# Patient Record
Sex: FEMALE | Race: WHITE | NOT HISPANIC OR LATINO | Employment: FULL TIME | URBAN - METROPOLITAN AREA
[De-identification: names, ages, dates, MRNs, and addresses within clinical notes are randomized per-mention and may not be internally consistent; named-entity substitution may affect disease eponyms.]

---

## 2018-07-30 LAB — POC BETA-HCG (QUAL): NEGATIVE

## 2018-08-17 ENCOUNTER — HISTORICAL (OUTPATIENT)
Dept: SURGERY | Facility: HOSPITAL | Age: 48
End: 2018-08-17

## 2018-08-17 LAB
ABS NEUT (OLG): 3.1 X10(3)/MCL (ref 1.5–6.9)
ALBUMIN SERPL-MCNC: 3.9 GM/DL (ref 3.4–5)
ALBUMIN/GLOB SERPL: 1 RATIO
ALP SERPL-CCNC: 62 UNIT/L (ref 30–113)
ALT SERPL-CCNC: 27 UNIT/L (ref 10–45)
APTT PPP: 24.6 SECOND(S) (ref 25–35)
AST SERPL-CCNC: 14 UNIT/L (ref 15–37)
B-HCG SERPL QL: NEGATIVE
BASOPHILS # BLD AUTO: 0 X10(3)/MCL (ref 0–0.1)
BASOPHILS NFR BLD AUTO: 0 % (ref 0–1)
BILIRUB SERPL-MCNC: 0.2 MG/DL (ref 0.1–0.9)
BILIRUBIN DIRECT+TOT PNL SERPL-MCNC: 0.1 MG/DL
BILIRUBIN DIRECT+TOT PNL SERPL-MCNC: 0.1 MG/DL (ref 0–0.3)
BUN SERPL-MCNC: 10 MG/DL (ref 10–20)
CALCIUM SERPL-MCNC: 9.6 MG/DL (ref 8–10.5)
CHLORIDE SERPL-SCNC: 108 MMOL/L (ref 100–108)
CO2 SERPL-SCNC: 29 MMOL/L (ref 21–35)
CREAT SERPL-MCNC: 1.16 MG/DL (ref 0.7–1.3)
EOSINOPHIL # BLD AUTO: 0.1 X10(3)/MCL (ref 0–0.6)
EOSINOPHIL NFR BLD AUTO: 1 % (ref 0–5)
ERYTHROCYTE [DISTWIDTH] IN BLOOD BY AUTOMATED COUNT: 16.2 % (ref 11.5–17)
GLOBULIN SER-MCNC: 3.8 GM/DL
GLUCOSE SERPL-MCNC: 124 MG/DL (ref 75–116)
HCT VFR BLD AUTO: 34.3 % (ref 36–48)
HGB BLD-MCNC: 10.6 GM/DL (ref 12–16)
INR PPP: 0.9 (ref 0–1.2)
LYMPHOCYTES # BLD AUTO: 3.9 X10(3)/MCL (ref 0.5–4.1)
LYMPHOCYTES NFR BLD AUTO: 48.3 % (ref 15–40)
MCH RBC QN AUTO: 28 PG (ref 27–34)
MCHC RBC AUTO-ENTMCNC: 31 GM/DL (ref 31–36)
MCV RBC AUTO: 91 FL (ref 80–99)
MONOCYTES # BLD AUTO: 1 X10(3)/MCL (ref 0–1.1)
MONOCYTES NFR BLD AUTO: 12 % (ref 4–12)
NEUTROPHILS # BLD AUTO: 3.1 X10(3)/MCL (ref 1.5–6.9)
NEUTROPHILS NFR BLD AUTO: 38 % (ref 43–75)
PLATELET # BLD AUTO: 302 X10(3)/MCL (ref 140–400)
PMV BLD AUTO: 10.5 FL (ref 6.8–10)
POTASSIUM SERPL-SCNC: 4.9 MMOL/L (ref 3.6–5.2)
PROT SERPL-MCNC: 7.7 GM/DL (ref 6.4–8.2)
PROTHROMBIN TIME: 9.5 SECOND(S) (ref 9–12)
RBC # BLD AUTO: 3.78 X10(6)/MCL (ref 4.2–5.4)
SODIUM SERPL-SCNC: 145 MMOL/L (ref 135–145)
WBC # SPEC AUTO: 8.1 X10(3)/MCL (ref 4.5–11.5)

## 2018-08-31 ENCOUNTER — HISTORICAL (OUTPATIENT)
Dept: ANESTHESIOLOGY | Facility: HOSPITAL | Age: 48
End: 2018-08-31

## 2018-08-31 LAB
B-HCG SERPL QL: NEGATIVE
CRC RECOMMENDATION EXT: NORMAL

## 2018-09-27 ENCOUNTER — HISTORICAL (OUTPATIENT)
Dept: MEDSURG UNIT | Facility: HOSPITAL | Age: 48
End: 2018-09-27

## 2018-09-27 LAB — B-HCG SERPL QL: NEGATIVE

## 2018-12-04 LAB — POC BETA-HCG (QUAL): NEGATIVE

## 2019-09-18 LAB — POC BETA-HCG (QUAL): NEGATIVE

## 2019-09-25 ENCOUNTER — HISTORICAL (OUTPATIENT)
Dept: RADIOLOGY | Facility: HOSPITAL | Age: 49
End: 2019-09-25

## 2019-10-29 ENCOUNTER — HISTORICAL (OUTPATIENT)
Dept: ADMINISTRATIVE | Facility: HOSPITAL | Age: 49
End: 2019-10-29

## 2020-01-28 ENCOUNTER — HISTORICAL (OUTPATIENT)
Dept: SURGERY | Facility: HOSPITAL | Age: 50
End: 2020-01-28

## 2020-01-28 LAB — POC BETA-HCG (QUAL): NEGATIVE

## 2022-01-21 LAB
HUMAN PAPILLOMAVIRUS (HPV): NORMAL
PAP RECOMMENDATION EXT: NORMAL
PAP SMEAR: NORMAL

## 2022-04-10 ENCOUNTER — HISTORICAL (OUTPATIENT)
Dept: ADMINISTRATIVE | Facility: HOSPITAL | Age: 52
End: 2022-04-10
Payer: MEDICARE

## 2022-04-27 VITALS
WEIGHT: 145.5 LBS | BODY MASS INDEX: 29.33 KG/M2 | DIASTOLIC BLOOD PRESSURE: 85 MMHG | SYSTOLIC BLOOD PRESSURE: 126 MMHG | HEIGHT: 59 IN

## 2022-04-30 NOTE — OP NOTE
ADMITTING DIAGNOSIS:  Symptomatic hemorrhoids, grade 3 type.    PROCEDURE:    1. PPH stapling of symptomatic hemorrhoids, grade 3 type.  2. Grade 3 internal hemorrhoids.  3. Incision and drainage of external thrombosed hemorrhoids at the 3, 6, 9 and 12 o'clock positions.    INDICATIONS:  The patient is a 47-year-old  female with a bipolar disorder, anemia, hypothyroidism, hypercholesterolemia, gastroesophageal reflux disease, and smokes about a half pack a day for 30 years.  The patient had symptomatic hemorrhoid disease with rectal pain.  No blood with wiping.  No family history of colorectal carcinoma.  She had a colonoscopy that showed grade 3 hemorrhoids that had been present for several weeks.  She desired hemorrhoidectomy because of her symptomatic issues.  Patient stated later on that she also had some blood per rectum.    DESCRIPTION OF PROCEDURE:  The patient was brought to the operating room.  Prone isi-knife position.  Spinal anesthetic.  Prepped and draped in a sterile fashion.  Had an anal dilator placed, sutured with 0 silk at the 3, 6, 9, and 12 o'clock positions.  Patient then had an anal probe inserted and pursestring suturing of the anorectal mucosa with a 2-0 Prolene.  Resection of the hemorrhoids was done with a 33 mm PPH stapler, Ethicon type.  Good circumferential mucosal proctectomy was done with a ring of rectal mucosal tissue excised.  The patient also had incision and drainage of thrombosed hemorrhoids at the 3, 6, 9 and 12 o'clock positions.  A skin tag at the 6 o'clock position was removed as well with Bovie cautery.  The wound was left open for drainage.  The patient had hemostasis with Bovie cautery.  Sterile dressings were applied.  No problems were encountered.  Patient tolerated procedure well.     I appreciate the consultation referral from Dr. Gerry Cline and will notify him of my findings as well as Dr. Moncada and Dr. Hirsch.       It should be noted that  the patient will stay overnight for monitoring because of her spinal anesthetic.        BBS/MODL   DD: 09/27/2018 1709   DT: 09/27/2018 1753  Job # [id number]    cc: Dr. Coral Cline Jr, M.D.

## 2022-04-30 NOTE — OP NOTE
DATE OF SURGERY:    01/28/2020    SURGEON:  Lazaro Crawford MD    PROCEDURE:  Bilateral reduction mammoplasty.    ANESTHESIA:  General.    HISTORY:  This is a 49-year-old female who presented with symptomatic macromastia.  She desired a breast reduction surgery.  Options were discussed, and she elected to proceed with a Wise pattern inferior pedicle type reduction.  She understood that there was a risk of potential need for free nipple graft if necessary.  She also understood that she was at increased risk of wound healing complications and necrosis of tissues secondary to her long history of smoking.  She did, however, quit smoking for approximately 4 weeks prior to surgery and did test negative for urine cotinine at her preoperative visit.  She signed the informed consent.    DESCRIPTION OF PROCEDURE:  The patient was identified in the preoperative holding area.  She was marked for a Bray pattern, inferior pedicle reduction mammoplasty, 42 mm nipple areolar complex.  She was then taken to the operating room, placed in supine position.  General endotracheal anesthesia was provided.  She was prepped and draped in sterile surgical fashion.  Time-out was taken, everyone was in agreement.       We initially started the procedure by de-epithelializing the pedicle around the nipple-areolar complex on the patient's right side.  I then incised the markings and elevated superior skin flaps approximately 2 cm thick down to the chest wall.  I then removed tissue from the medial, lateral, and posterior aspects surrounding the pedicle with electrocautery.  I copiously irrigated out the wound, ensured hemostasis with electrocautery.  I then subsequently temporarily tacked the incisions closed with staples and then subsequently moved to the patient's left side, similarly de-epithelialized the pedicle, subsequently removed tissue with electrocautery from the medial, lateral, and posterior aspects surrounding the pedicle.  I  copiously irrigated out the wounds, ensured hemostasis, and temporarily tacked the wound to evaluate for symmetry in both the upright and supine positions, which was noted to be very good.       I then used a 42 mm template to alex the area of nipple-areolar complex translocation.  This done right and left sides.  I then incised the skin with a 15 blade scalpel and removed this extra tissue with electrocautery.  Please note, right and left breast specimens were then passed off the field.  They were weighed.  I then subsequently translocated each nipple-areolar complex.  Dermal structures of the inframammary fold and vertical incision were closed with 0 Vicryl sutures in interrupted fashion.  Dermal structures of the nipple-areolar complex were approximated with 3-0 Vicryl in interrupted fashion.  The inframammary fold incision was closed with 2-0 subcuticular Stratafix.  The vertical incisions and nipple-areolar complex were     then further closed with 3-0 Monocryl in a subcuticular fashion on the right and left sides.  __________ tape was then placed as dressing.  There were no complications.  All counts were correct.        ______________________________  MD DUKE Arzate/  DD:  01/28/2020  Time:  11:34AM  DT:  01/28/2020  Time:  11:52AM  Job #:  [id number]

## 2022-05-03 NOTE — HISTORICAL [facility] CERNER
This is a historical note converted from [facility]. Formatting and pictures may have been removed.  Please reference Aidan for original formatting and attached multimedia. Chief Complaint  1 month f/u left ankle fx, xrays today....sm  History of Present Illness  having much less pain in the ankle  Patient here today for follow-up from left ankle fracture  Having less pain  Still with some swelling and stiffness  No numbness or tingling  Review of Systems  Constitutional: No fever, No chills.  Respiratory: No shortness of breath, No cough.  Cardiovascular: No chest pain.  Gastrointestinal: No nausea, No vomiting, No diarrhea, No constipation, No heartburn.  Genitourinary: No dysuria, No hematuria.  Hematology/Lymphatics: No bleeding tendency.  Endocrine: No polyuria.  Neurologic: Alert and oriented X4, No numbness, No tingling.  Psychiatric: No anxiety, No depression.  Integumentary: ?negative except as documented in history of present illness  Physical Exam  Vitals & Measurements  T:?98.1? ?F (Oral)? HR:?84(Peripheral)? BP:?126/82?  HT:?150?cm? WT:?83.91?kg? BMI:?37.29? LMP:?10/22/2019 00:00 CDT?  left ankle?exam  ROM?near normal  Mild swelling and decreased ROM  CMS intact to the?LLE  mild TTP over fracture site  ???  X-rays show? healing fracture with callus formation  ?   Plan:  Continue with home exercises  Follow-up in prn  ?   may begin to wean out of the boot  ?   Patient seen and examined today by Dr. Hatfield  Assessment/Plan  1.?Avulsion fracture of left ankle?S82.892A  Ordered:  Clinic Follow up, 10/29/19 10:04:00 CDT, prn, Avulsion fracture of left ankle, [facility]  Office/Outpatient Visit Level 3 Established [id number] PC, Avulsion fracture of left ankle, [facility], 10/29/19 10:04:00 CDT  ?  Referrals  Clinic Follow up, 10/29/19 10:04:00 CDT, prn, Avulsion fracture of left ankle, [facility]   Problem List/Past Medical History  Ongoing  Avulsion fracture of left  ankle  Bipolar  Bipolar  Carpal tunnel  Hemorrhoid  Hypothyroid  Hypothyroidism  Tobacco user  Historical  No qualifying data  Procedure/Surgical History  Excision of Anus, External Approach (09/27/2018)  Excision of Hemorrhoidal Plexus, Open Approach (09/27/2018)  Excision of multiple external papillae or tags, anus (09/27/2018)  Hemorrhoidectomy W/Stapling (09/27/2018)  Hemorrhoidopexy (eg, for prolapsing internal hemorrhoids) by stapling (09/27/2018)  Incision of thrombosed hemorrhoid, external (09/27/2018)  Incision of thrombosed hemorrhoid, external (09/27/2018)  Incision of thrombosed hemorrhoid, external (09/27/2018)  Incision of thrombosed hemorrhoid, external (09/27/2018)  Inspection of Gastrointestinal Tract, Open Approach (09/27/2018)  Biopsy Gastrointestinal (08/31/2018)  Colonoscopy (08/31/2018)  Colonoscopy, flexible; diagnostic, including collection of specimen(s) by brushing or washing, when performed (separate procedure) (08/31/2018)  Esophagogastroduodenoscopy (08/31/2018)  Esophagogastroduodenoscopy, flexible, transoral; with biopsy, single or multiple (08/31/2018)  Excision of Stomach, Via Natural or Artificial Opening Endoscopic, Diagnostic (08/31/2018)  Inspection of Lower Intestinal Tract, Via Natural or Artificial Opening Endoscopic (08/31/2018)  left carpal tunnel (08/23/2018)  Destruction of Esophagogastric Junction, Via Natural or Artificial Opening Endoscopic (07/15/2016)  EGD With Thrml Tx GERD (Upper, Upper, Upper) (07/15/2016)  Esophagogastroduodenoscopy, flexible, transoral; with delivery of thermal energy to the muscle of lower esophageal sphincter and/or gastric cardia, for treatment of gastroesophageal reflux disease (07/15/2016)  Bilateral tubal ligation  None  rt carpal tunnel  Tonsillectomy  tubes in ears   Medications  AZELASTINE SPR 0.1%, 1 spray, INH, BID  Chantix Starter Pack 0.5 mg-1 mg oral tablet  CLONAZEPAM TAB 1MG, 1 mg, Oral, Daily, PRN  Effexor  mg oral capsule,  extended release, 150 mg= 1 cap(s), Oral, qAM  Enbrace HR oral capsule, 1 cap(s), Oral, Daily, 12 refills  EpiPen 2-Juanito 0.3 mg injectable kit, See Instructions  ESTAZOLAM TAB 2MG, 2 mg= 1 tab(s), Oral, Once a day (at bedtime)  FLUTICASONE SPR 50MCG, 2 spray(s), Nasal, qAM  LaMICtal, 150 mg, Oral, qAM  levothyroxine 88 mcg (0.088 mg) oral tablet, 88 mcg= 1 tab(s), Oral, qAM  loratidine oral tablet, 10 mg, Oral, qAM  Protonix 40 mg ORAL enteric coated tablet, 40 mg= 1 tab(s), Oral, qAM  rizatriptan 5 mg oral tablet, 5 mg= 1 tab(s), Oral, Daily, PRN  ROSUVASTATIN TAB 10MG, 1 tab, Oral, qPM  traMADol 50 mg oral tablet, 50 mg= 1 tab(s), Oral, q6hr   oral tablet ([facility] Substitution), 1 tab(s), Oral, Daily, 11 refills  ziprasidone 80 mg oral capsule, 1 cap, Oral, qPM  Allergies  No Known Medication Allergies  Social History  Abuse/Neglect  No, 09/18/2019  Alcohol  Never, 04/14/2016  Employment/School  Work/School description: student - medical coding., 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  Hyperthyroidism.: Mother.  Health Maintenance  Health Maintenance  ???Pending?(in the next year)  ??? ??OverDue  ??? ? ? ?Diabetes Screening due??and every?  ??? ? ? ?Alcohol Misuse Screening due??01/01/19??and every 1??year(s)  ??? ? ? ?Smoking Cessation due??01/01/19??and every 1??year(s)  ??? ??Due?  ??? ? ? ?ADL Screening due??10/29/19??and every 1??year(s)  ??? ? ? ?Influenza Vaccine due??10/29/19??and every?  ??? ? ? ?Tetanus Vaccine due??10/29/19??and every 10??year(s)  ??? ??Due In Future?  ??? ? ? ?Obesity Screening not due until??01/01/20??and every 1??year(s)  ??? ? ? ?Blood Pressure Screening not due until??09/25/20??and every  1??year(s)  ??? ? ? ?Body Mass Index Check not due until??09/25/20??and every 1??year(s)  ???Satisfied?(in the past 1 year)  ??? ??Satisfied?  ??? ? ? ?Blood Pressure Screening on??10/29/19.??Satisfied by Renae Acosta LPN  ??? ? ? ?Body Mass Index Check on??10/29/19.??Satisfied by Renae Acosta LPN  ??? ? ? ?Breast Cancer Screening on??12/03/18.??Satisfied by Toan Cline MD  ??? ? ? ?Cervical Cancer Screening on??09/18/19.??Satisfied by Wilfrido Gómez  ??? ? ? ?Influenza Vaccine on??09/18/19.??Satisfied by Samantha Ji  ??? ? ? ?Obesity Screening on??10/29/19.??Satisfied by Renae Acosta LPN  ?

## 2022-09-17 ENCOUNTER — HISTORICAL (OUTPATIENT)
Dept: ADMINISTRATIVE | Facility: HOSPITAL | Age: 52
End: 2022-09-17
Payer: COMMERCIAL

## 2022-11-28 ENCOUNTER — DOCUMENTATION ONLY (OUTPATIENT)
Dept: ADMINISTRATIVE | Facility: HOSPITAL | Age: 52
End: 2022-11-28
Payer: COMMERCIAL

## 2023-01-23 ENCOUNTER — OFFICE VISIT (OUTPATIENT)
Dept: GYNECOLOGY | Facility: CLINIC | Age: 53
End: 2023-01-23
Payer: COMMERCIAL

## 2023-01-23 VITALS
BODY MASS INDEX: 33.19 KG/M2 | HEART RATE: 82 BPM | SYSTOLIC BLOOD PRESSURE: 121 MMHG | RESPIRATION RATE: 20 BRPM | TEMPERATURE: 98 F | DIASTOLIC BLOOD PRESSURE: 84 MMHG | WEIGHT: 164.63 LBS | HEIGHT: 59 IN | OXYGEN SATURATION: 100 %

## 2023-01-23 DIAGNOSIS — Z01.419 ENCOUNTER FOR ANNUAL ROUTINE GYNECOLOGICAL EXAMINATION: Primary | ICD-10-CM

## 2023-01-23 DIAGNOSIS — R23.2 HOT FLASHES: ICD-10-CM

## 2023-01-23 PROCEDURE — 1159F PR MEDICATION LIST DOCUMENTED IN MEDICAL RECORD: ICD-10-PCS | Mod: CPTII,,, | Performed by: NURSE PRACTITIONER

## 2023-01-23 PROCEDURE — 3008F PR BODY MASS INDEX (BMI) DOCUMENTED: ICD-10-PCS | Mod: CPTII,,, | Performed by: NURSE PRACTITIONER

## 2023-01-23 PROCEDURE — 3008F BODY MASS INDEX DOCD: CPT | Mod: CPTII,,, | Performed by: NURSE PRACTITIONER

## 2023-01-23 PROCEDURE — 99215 OFFICE O/P EST HI 40 MIN: CPT | Mod: PBBFAC | Performed by: NURSE PRACTITIONER

## 2023-01-23 PROCEDURE — 3074F PR MOST RECENT SYSTOLIC BLOOD PRESSURE < 130 MM HG: ICD-10-PCS | Mod: CPTII,,, | Performed by: NURSE PRACTITIONER

## 2023-01-23 PROCEDURE — 99396 PREV VISIT EST AGE 40-64: CPT | Mod: S$PBB,,, | Performed by: NURSE PRACTITIONER

## 2023-01-23 PROCEDURE — 3079F PR MOST RECENT DIASTOLIC BLOOD PRESSURE 80-89 MM HG: ICD-10-PCS | Mod: CPTII,,, | Performed by: NURSE PRACTITIONER

## 2023-01-23 PROCEDURE — 3079F DIAST BP 80-89 MM HG: CPT | Mod: CPTII,,, | Performed by: NURSE PRACTITIONER

## 2023-01-23 PROCEDURE — 1159F MED LIST DOCD IN RCRD: CPT | Mod: CPTII,,, | Performed by: NURSE PRACTITIONER

## 2023-01-23 PROCEDURE — 99396 PR PREVENTIVE VISIT,EST,40-64: ICD-10-PCS | Mod: S$PBB,,, | Performed by: NURSE PRACTITIONER

## 2023-01-23 PROCEDURE — 3074F SYST BP LT 130 MM HG: CPT | Mod: CPTII,,, | Performed by: NURSE PRACTITIONER

## 2023-01-23 RX ORDER — ERENUMAB-AOOE 70 MG/ML
70 INJECTION SUBCUTANEOUS
COMMUNITY
Start: 2022-12-09

## 2023-01-23 RX ORDER — VENLAFAXINE HYDROCHLORIDE 150 MG/1
CAPSULE, EXTENDED RELEASE ORAL DAILY
COMMUNITY
Start: 2022-11-15

## 2023-01-23 RX ORDER — SUMATRIPTAN 50 MG/1
50 TABLET, FILM COATED ORAL DAILY PRN
COMMUNITY
Start: 2023-01-16

## 2023-01-23 RX ORDER — AZELASTINE 1 MG/ML
SPRAY, METERED NASAL
COMMUNITY
Start: 2022-12-05

## 2023-01-23 RX ORDER — AMLODIPINE BESYLATE 5 MG/1
5 TABLET ORAL EVERY MORNING
COMMUNITY
Start: 2022-09-13

## 2023-01-23 RX ORDER — ZOLPIDEM TARTRATE 10 MG/1
1 TABLET ORAL EVERY MORNING
COMMUNITY
Start: 2023-01-10

## 2023-01-23 RX ORDER — BUSPIRONE HYDROCHLORIDE 15 MG/1
15 TABLET ORAL
COMMUNITY
Start: 2022-09-26

## 2023-01-23 RX ORDER — PANTOPRAZOLE SODIUM 40 MG/1
1 TABLET, DELAYED RELEASE ORAL DAILY
COMMUNITY
Start: 2022-12-05

## 2023-01-23 RX ORDER — LAMOTRIGINE 200 MG/1
1 TABLET, EXTENDED RELEASE ORAL EVERY MORNING
COMMUNITY

## 2023-01-23 RX ORDER — LURASIDONE HYDROCHLORIDE 40 MG/1
40 TABLET, FILM COATED ORAL
COMMUNITY
Start: 2023-01-10

## 2023-01-23 RX ORDER — LEVOTHYROXINE SODIUM 88 UG/1
1 TABLET ORAL DAILY
COMMUNITY
Start: 2023-01-16

## 2023-01-23 NOTE — PROGRESS NOTES
"  Subjective:       Patient ID: Katt Ellsworth is a 52 y.o. female.    Chief Complaint:  Gynecologic Exam      History of Present Illness  The patient  here for annual exam. Her LMP was 22. Period last 2-3 days and changes tampons 2x/day. Pt is perimenopausal. Admits history of HPV in teens. Colpo in 2018 for HPV positive. Pap in  and  NIL and HPV neg. MG 2022 at [facility], Lt breast cyst. Hx of breast reduction. Denies breast or urinary complaints. Denies pelvic pain, abnormal bleeding or discharge. Hx of AUB in the past. Pt reports no STIs in the past and no concerns. Pt does c/o night sweats. Contraception consist of TL. Denies fly hx of breast, ovarian, uterine cancer. Paternal aunt with colon cancer. No GYN complaints in the past.    GYN & OB History  Patient's last menstrual period was 2022 (exact date).   Date of Last Pap:     Review of patient's allergies indicates:   Allergen Reactions    Doxycycline Anaphylaxis, Nausea And Vomiting and Hives    Folic acid      Past Medical History:   Diagnosis Date    Abnormal Pap smear of cervix     Anemia     Hypertension      OB History    Para Term  AB Living   3 3           SAB IAB Ectopic Multiple Live Births                  # Outcome Date GA Lbr Dave/2nd Weight Sex Delivery Anes PTL Lv   3 Para            2 Para            1 Para                 Review of Systems  Review of Systems    Negative except for pertinent findings for positives per HPI     Objective:    Physical Exam    /84 (BP Location: Right arm, Patient Position: Sitting, BP Method: Medium (Automatic))   Pulse 82   Temp 98.3 °F (36.8 °C) (Oral)   Resp 20   Ht 4' 11" (1.499 m)   Wt 74.7 kg (164 lb 9.6 oz)   LMP 2022 (Exact Date)   SpO2 100%   BMI 33.25 kg/m²   GENERAL: Well-developed female in no acute distress.  SKIN: Normal to inspection, warm and intact.  BREASTS: No masses, lumps, discharge, tenderness.  VULVA: General " appearance normal; external genitalia with no lesions or erythema.  VAGINA: Mucosa normal, pink, no abnormal discharge or lesions.  CERVIX: Grossly normal, pink, no erythema or abnormal discharge.  BIMANUAL EXAM: reveals a 12 week-sized uterus. The uterus is non tender. Juan Alberto adnexa reveal no evidence of masses, tenderness.  PSYCHIATRIC: Patient is oriented to person, place, and time. Mood and affect are normal.    Assessment:       1. Encounter for annual routine gynecological examination    2. Hot flashes       Plan:   Katt was seen today for gynecologic exam.    Diagnoses and all orders for this visit:    Encounter for annual routine gynecological examination    Hot flashes    Pelvic exam, pap utd per ACOG  Night sweats, discussed medication options. Pt would like start with OTC.    Encouraged well balanced diet and exercise 3-4x per week; use of handheld and home fan at bedside, keep home cooled. Encouraged wearing layers to remove as needed, light colored clothes and linen fabrics. Avoid spicy foods and excessive caffeine use.  Follow up in about 1 year (around 1/23/2024) for annual exam.

## 2023-04-24 ENCOUNTER — HOSPITAL ENCOUNTER (OUTPATIENT)
Facility: HOSPITAL | Age: 53
Discharge: HOME | End: 2023-04-25
Attending: EMERGENCY MEDICINE | Admitting: SURGERY
Payer: COMMERCIAL

## 2023-04-24 DIAGNOSIS — S22.41XA CLOSED FRACTURE OF MULTIPLE RIBS OF RIGHT SIDE, INITIAL ENCOUNTER: ICD-10-CM

## 2023-04-24 DIAGNOSIS — S01.81XA LACERATION OF FOREHEAD, INITIAL ENCOUNTER: ICD-10-CM

## 2023-04-24 DIAGNOSIS — S09.90XA CLOSED HEAD INJURY, INITIAL ENCOUNTER: Primary | ICD-10-CM

## 2023-04-24 DIAGNOSIS — S80.211A ABRASION OF KNEE, BILATERAL: ICD-10-CM
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
## 2023-04-24 DIAGNOSIS — R07.89 CHEST WALL PAIN: ICD-10-CM

## 2023-04-24 DIAGNOSIS — S80.212A ABRASION OF KNEE, BILATERAL: ICD-10-CM

## 2023-04-24 DIAGNOSIS — J98.2 PNEUMOMEDIASTINUM: ICD-10-CM

## 2023-04-24 DIAGNOSIS — R07.9 CHEST PAIN: ICD-10-CM

## 2023-04-24 DIAGNOSIS — R10.11 RIGHT UPPER QUADRANT ABDOMINAL PAIN: ICD-10-CM

## 2023-04-24 PROBLEM — S22.49XA CLOSED FRACTURE OF MULTIPLE RIBS: Status: ACTIVE | Noted: 2023-04-24

## 2023-04-24 LAB
ABORH RETYPE: NORMAL
ALBUMIN SERPL-MCNC: 3.9 G/DL (ref 3.5–5)
ALBUMIN/GLOB SERPL: 1.2 RATIO (ref 1.1–2)
ALP SERPL-CCNC: 52 UNIT/L (ref 40–150)
ALT SERPL-CCNC: 15 UNIT/L (ref 0–55)
APTT PPP: 27 SECONDS (ref 23.2–33.7)
AST SERPL-CCNC: 23 UNIT/L (ref 5–34)
BASOPHILS # BLD AUTO: 0.08 X10(3)/MCL (ref 0–0.2)
BASOPHILS NFR BLD AUTO: 0.7 %
BILIRUBIN DIRECT+TOT PNL SERPL-MCNC: 0.2 MG/DL
BUN SERPL-MCNC: 14.1 MG/DL (ref 8.4–25.7)
CALCIUM SERPL-MCNC: 8.9 MG/DL (ref 8.4–10.2)
CHLORIDE SERPL-SCNC: 100 MMOL/L (ref 98–107)
CO2 SERPL-SCNC: 21 MMOL/L (ref 22–29)
CREAT SERPL-MCNC: 1.2 MG/DL (ref 0.73–1.18)
EOSINOPHIL # BLD AUTO: 0.08 X10(3)/MCL (ref 0–0.9)
EOSINOPHIL NFR BLD AUTO: 0.7 %
ERYTHROCYTE [DISTWIDTH] IN BLOOD BY AUTOMATED COUNT: 16.4 % (ref 11.5–17)
ETHANOL SERPL-MCNC: <10 MG/DL
GFR SERPLBLD CREATININE-BSD FMLA CKD-EPI: >60 MLS/MIN/1.73/M2
GLOBULIN SER-MCNC: 3.2 GM/DL (ref 2.4–3.5)
GLUCOSE SERPL-MCNC: 168 MG/DL (ref 74–100)
GROUP & RH: NORMAL
HCT VFR BLD AUTO: 31.5 % (ref 42–52)
HGB BLD-MCNC: 9.8 G/DL (ref 14–18)
IMM GRANULOCYTES # BLD AUTO: 0.04 X10(3)/MCL (ref 0–0.04)
IMM GRANULOCYTES NFR BLD AUTO: 0.4 %
INDIRECT COOMBS GEL: NORMAL
INR BLD: 0.92 (ref 0–1.3)
LACTATE SERPL-SCNC: 1.5 MMOL/L (ref 0.5–2.2)
LYMPHOCYTES # BLD AUTO: 3.39 X10(3)/MCL (ref 0.6–4.6)
LYMPHOCYTES NFR BLD AUTO: 29.9 %
MCH RBC QN AUTO: 26.8 PG (ref 27–31)
MCHC RBC AUTO-ENTMCNC: 31.1 G/DL (ref 33–36)
MCV RBC AUTO: 86.3 FL (ref 80–94)
MONOCYTES # BLD AUTO: 1.26 X10(3)/MCL (ref 0.1–1.3)
MONOCYTES NFR BLD AUTO: 11.1 %
NEUTROPHILS # BLD AUTO: 6.48 X10(3)/MCL (ref 2.1–9.2)
NEUTROPHILS NFR BLD AUTO: 57.2 %
NRBC BLD AUTO-RTO: 0 %
PLATELET # BLD AUTO: 383 X10(3)/MCL (ref 130–400)
PMV BLD AUTO: 10 FL (ref 7.4–10.4)
POTASSIUM SERPL-SCNC: 3.9 MMOL/L (ref 3.5–5.1)
PROT SERPL-MCNC: 7.1 GM/DL (ref 6.4–8.3)
PROTHROMBIN TIME: 12.3 SECONDS (ref 12.5–14.5)
RBC # BLD AUTO: 3.65 X10(6)/MCL (ref 4.7–6.1)
SODIUM SERPL-SCNC: 135 MMOL/L (ref 136–145)
SPECIMEN OUTDATE: NORMAL
TROPONIN I SERPL-MCNC: 0.01 NG/ML (ref 0–0.04)
WBC # SPEC AUTO: 11.3 X10(3)/MCL (ref 4.5–11.5)

## 2023-04-24 PROCEDURE — G0390 TRAUMA RESPONS W/HOSP CRITI: HCPCS

## 2023-04-24 PROCEDURE — 96374 THER/PROPH/DIAG INJ IV PUSH: CPT | Mod: 59

## 2023-04-24 PROCEDURE — 25500020 PHARM REV CODE 255: Performed by: EMERGENCY MEDICINE

## 2023-04-24 PROCEDURE — 96375 TX/PRO/DX INJ NEW DRUG ADDON: CPT

## 2023-04-24 PROCEDURE — 99285 EMERGENCY DEPT VISIT HI MDM: CPT | Mod: 25

## 2023-04-24 PROCEDURE — 12014 RPR F/E/E/N/L/M 5.1-7.5 CM: CPT

## 2023-04-24 PROCEDURE — 85610 PROTHROMBIN TIME: CPT | Performed by: EMERGENCY MEDICINE

## 2023-04-24 PROCEDURE — 83605 ASSAY OF LACTIC ACID: CPT | Performed by: EMERGENCY MEDICINE

## 2023-04-24 PROCEDURE — 80053 COMPREHEN METABOLIC PANEL: CPT | Performed by: EMERGENCY MEDICINE

## 2023-04-24 PROCEDURE — 96361 HYDRATE IV INFUSION ADD-ON: CPT

## 2023-04-24 PROCEDURE — 90471 IMMUNIZATION ADMIN: CPT | Performed by: EMERGENCY MEDICINE

## 2023-04-24 PROCEDURE — 63600175 PHARM REV CODE 636 W HCPCS: Performed by: EMERGENCY MEDICINE

## 2023-04-24 PROCEDURE — 90715 TDAP VACCINE 7 YRS/> IM: CPT | Performed by: EMERGENCY MEDICINE

## 2023-04-24 PROCEDURE — 85025 COMPLETE CBC W/AUTO DIFF WBC: CPT | Performed by: EMERGENCY MEDICINE

## 2023-04-24 PROCEDURE — 96376 TX/PRO/DX INJ SAME DRUG ADON: CPT

## 2023-04-24 PROCEDURE — 86900 BLOOD TYPING SEROLOGIC ABO: CPT | Performed by: EMERGENCY MEDICINE

## 2023-04-24 PROCEDURE — 84484 ASSAY OF TROPONIN QUANT: CPT | Performed by: EMERGENCY MEDICINE

## 2023-04-24 PROCEDURE — 85730 THROMBOPLASTIN TIME PARTIAL: CPT | Performed by: EMERGENCY MEDICINE

## 2023-04-24 PROCEDURE — 82077 ASSAY SPEC XCP UR&BREATH IA: CPT | Performed by: EMERGENCY MEDICINE

## 2023-04-24 RX ORDER — HYDROMORPHONE HYDROCHLORIDE 2 MG/ML
1 INJECTION, SOLUTION INTRAMUSCULAR; INTRAVENOUS; SUBCUTANEOUS
Status: COMPLETED | OUTPATIENT
Start: 2023-04-24 | End: 2023-04-24

## 2023-04-24 RX ORDER — FENTANYL CITRATE 50 UG/ML
INJECTION, SOLUTION INTRAMUSCULAR; INTRAVENOUS CODE/TRAUMA/SEDATION MEDICATION
Status: COMPLETED | OUTPATIENT
Start: 2023-04-24 | End: 2023-04-24

## 2023-04-24 RX ORDER — CEFAZOLIN SODIUM 1 G/3ML
INJECTION, POWDER, FOR SOLUTION INTRAMUSCULAR; INTRAVENOUS
Status: DISCONTINUED
Start: 2023-04-24 | End: 2023-04-24 | Stop reason: WASHOUT

## 2023-04-24 RX ORDER — FENTANYL CITRATE 50 UG/ML
INJECTION, SOLUTION INTRAMUSCULAR; INTRAVENOUS
Status: DISPENSED
Start: 2023-04-24 | End: 2023-04-25

## 2023-04-24 RX ADMIN — IOPAMIDOL 100 ML: 755 INJECTION, SOLUTION INTRAVENOUS at 10:04

## 2023-04-24 RX ADMIN — HYDROMORPHONE HYDROCHLORIDE 1 MG: 2 INJECTION INTRAMUSCULAR; INTRAVENOUS; SUBCUTANEOUS at 11:04

## 2023-04-24 RX ADMIN — HYDROMORPHONE HYDROCHLORIDE 1 MG: 2 INJECTION, SOLUTION INTRAMUSCULAR; INTRAVENOUS; SUBCUTANEOUS at 10:04

## 2023-04-24 RX ADMIN — FENTANYL CITRATE 50 MCG: 50 INJECTION, SOLUTION INTRAMUSCULAR; INTRAVENOUS at 09:04

## 2023-04-24 RX ADMIN — TETANUS TOXOID, REDUCED DIPHTHERIA TOXOID AND ACELLULAR PERTUSSIS VACCINE, ADSORBED 0.5 ML: 5; 2.5; 8; 8; 2.5 SUSPENSION INTRAMUSCULAR at 09:04

## 2023-04-24 RX ADMIN — SODIUM CHLORIDE, POTASSIUM CHLORIDE, SODIUM LACTATE AND CALCIUM CHLORIDE 1000 ML: 600; 310; 30; 20 INJECTION, SOLUTION INTRAVENOUS at 10:04

## 2023-04-25 VITALS
TEMPERATURE: 98 F | HEIGHT: 59 IN | BODY MASS INDEX: 34.27 KG/M2 | WEIGHT: 170 LBS | RESPIRATION RATE: 14 BRPM | HEART RATE: 78 BPM | OXYGEN SATURATION: 99 % | DIASTOLIC BLOOD PRESSURE: 72 MMHG | SYSTOLIC BLOOD PRESSURE: 115 MMHG

## 2023-04-25 LAB
AMPHET UR QL SCN: NEGATIVE
ANION GAP SERPL CALC-SCNC: 6 MEQ/L
APPEARANCE UR: CLEAR
BACTERIA #/AREA URNS AUTO: NORMAL /HPF
BARBITURATE SCN PRESENT UR: NEGATIVE
BASOPHILS # BLD AUTO: 0.05 X10(3)/MCL (ref 0–0.2)
BASOPHILS NFR BLD AUTO: 0.5 %
BENZODIAZ UR QL SCN: NEGATIVE
BILIRUB UR QL STRIP.AUTO: NEGATIVE MG/DL
BUN SERPL-MCNC: 10.8 MG/DL (ref 9.8–20.1)
CALCIUM SERPL-MCNC: 9.2 MG/DL (ref 8.4–10.2)
CANNABINOIDS UR QL SCN: POSITIVE
CHLORIDE SERPL-SCNC: 104 MMOL/L (ref 98–107)
CO2 SERPL-SCNC: 27 MMOL/L (ref 22–29)
COCAINE UR QL SCN: NEGATIVE
COLOR UR AUTO: YELLOW
CREAT SERPL-MCNC: 1.02 MG/DL (ref 0.55–1.02)
CREAT/UREA NIT SERPL: 11
EOSINOPHIL # BLD AUTO: 0.01 X10(3)/MCL (ref 0–0.9)
EOSINOPHIL NFR BLD AUTO: 0.1 %
ERYTHROCYTE [DISTWIDTH] IN BLOOD BY AUTOMATED COUNT: 16.7 % (ref 11.5–17)
FENTANYL UR QL SCN: NEGATIVE
GFR SERPLBLD CREATININE-BSD FMLA CKD-EPI: >60 MLS/MIN/1.73/M2
GLUCOSE SERPL-MCNC: 92 MG/DL (ref 74–100)
GLUCOSE UR QL STRIP.AUTO: NEGATIVE MG/DL
HCT VFR BLD AUTO: 30 % (ref 37–47)
HGB BLD-MCNC: 9.2 G/DL (ref 12–16)
IMM GRANULOCYTES # BLD AUTO: 0.03 X10(3)/MCL (ref 0–0.04)
IMM GRANULOCYTES NFR BLD AUTO: 0.3 %
KETONES UR QL STRIP.AUTO: NEGATIVE MG/DL
LEUKOCYTE ESTERASE UR QL STRIP.AUTO: NEGATIVE UNIT/L
LYMPHOCYTES # BLD AUTO: 2.38 X10(3)/MCL (ref 0.6–4.6)
LYMPHOCYTES NFR BLD AUTO: 23.2 %
MAGNESIUM SERPL-MCNC: 1.8 MG/DL (ref 1.6–2.6)
MCH RBC QN AUTO: 26.1 PG (ref 27–31)
MCHC RBC AUTO-ENTMCNC: 30.7 G/DL (ref 33–36)
MCV RBC AUTO: 85.2 FL (ref 80–94)
MDMA UR QL SCN: NEGATIVE
MONOCYTES # BLD AUTO: 1.32 X10(3)/MCL (ref 0.1–1.3)
MONOCYTES NFR BLD AUTO: 12.9 %
NEUTROPHILS # BLD AUTO: 6.48 X10(3)/MCL (ref 2.1–9.2)
NEUTROPHILS NFR BLD AUTO: 63 %
NITRITE UR QL STRIP.AUTO: NEGATIVE
NRBC BLD AUTO-RTO: 0 %
OPIATES UR QL SCN: NEGATIVE
PCP UR QL: NEGATIVE
PH UR STRIP.AUTO: 6.5 [PH]
PH UR: 6.5 [PH] (ref 3–11)
PHOSPHATE SERPL-MCNC: 4.2 MG/DL (ref 2.3–4.7)
PLATELET # BLD AUTO: 363 X10(3)/MCL (ref 130–400)
PMV BLD AUTO: 9.9 FL (ref 7.4–10.4)
POTASSIUM SERPL-SCNC: 4.2 MMOL/L (ref 3.5–5.1)
PROT UR QL STRIP.AUTO: NEGATIVE MG/DL
RBC # BLD AUTO: 3.52 X10(6)/MCL (ref 4.2–5.4)
RBC #/AREA URNS AUTO: <5 /HPF
RBC UR QL AUTO: NEGATIVE UNIT/L
SODIUM SERPL-SCNC: 137 MMOL/L (ref 136–145)
SP GR UR STRIP.AUTO: 1.02 (ref 1–1.03)
SQUAMOUS #/AREA URNS AUTO: <5 /HPF
UROBILINOGEN UR STRIP-ACNC: 0.2 MG/DL
WBC # SPEC AUTO: 10.3 X10(3)/MCL (ref 4.5–11.5)
WBC #/AREA URNS AUTO: <5 /HPF

## 2023-04-25 PROCEDURE — 99900035 HC TECH TIME PER 15 MIN (STAT)

## 2023-04-25 PROCEDURE — 27000646 HC AEROBIKA DEVICE

## 2023-04-25 PROCEDURE — 80048 BASIC METABOLIC PNL TOTAL CA: CPT | Performed by: STUDENT IN AN ORGANIZED HEALTH CARE EDUCATION/TRAINING PROGRAM

## 2023-04-25 PROCEDURE — 25000003 PHARM REV CODE 250: Performed by: STUDENT IN AN ORGANIZED HEALTH CARE EDUCATION/TRAINING PROGRAM

## 2023-04-25 PROCEDURE — 83735 ASSAY OF MAGNESIUM: CPT | Performed by: STUDENT IN AN ORGANIZED HEALTH CARE EDUCATION/TRAINING PROGRAM

## 2023-04-25 PROCEDURE — S4991 NICOTINE PATCH NONLEGEND: HCPCS | Performed by: STUDENT IN AN ORGANIZED HEALTH CARE EDUCATION/TRAINING PROGRAM

## 2023-04-25 PROCEDURE — 84100 ASSAY OF PHOSPHORUS: CPT | Performed by: STUDENT IN AN ORGANIZED HEALTH CARE EDUCATION/TRAINING PROGRAM

## 2023-04-25 PROCEDURE — 80307 DRUG TEST PRSMV CHEM ANLYZR: CPT | Performed by: EMERGENCY MEDICINE

## 2023-04-25 PROCEDURE — 81001 URINALYSIS AUTO W/SCOPE: CPT | Performed by: EMERGENCY MEDICINE

## 2023-04-25 PROCEDURE — 96372 THER/PROPH/DIAG INJ SC/IM: CPT | Performed by: STUDENT IN AN ORGANIZED HEALTH CARE EDUCATION/TRAINING PROGRAM

## 2023-04-25 PROCEDURE — 94664 DEMO&/EVAL PT USE INHALER: CPT

## 2023-04-25 PROCEDURE — 85025 COMPLETE CBC W/AUTO DIFF WBC: CPT | Performed by: STUDENT IN AN ORGANIZED HEALTH CARE EDUCATION/TRAINING PROGRAM

## 2023-04-25 PROCEDURE — 93010 EKG 12-LEAD: ICD-10-PCS | Mod: ,,, | Performed by: INTERNAL MEDICINE

## 2023-04-25 PROCEDURE — G0378 HOSPITAL OBSERVATION PER HR: HCPCS

## 2023-04-25 PROCEDURE — 93005 ELECTROCARDIOGRAM TRACING: CPT

## 2023-04-25 PROCEDURE — 63600175 PHARM REV CODE 636 W HCPCS: Performed by: STUDENT IN AN ORGANIZED HEALTH CARE EDUCATION/TRAINING PROGRAM

## 2023-04-25 PROCEDURE — 93010 ELECTROCARDIOGRAM REPORT: CPT | Mod: ,,, | Performed by: INTERNAL MEDICINE

## 2023-04-25 PROCEDURE — 94799 UNLISTED PULMONARY SVC/PX: CPT

## 2023-04-25 RX ORDER — FAMOTIDINE 20 MG/1
20 TABLET, FILM COATED ORAL 2 TIMES DAILY
Status: DISCONTINUED | OUTPATIENT
Start: 2023-04-25 | End: 2023-04-25

## 2023-04-25 RX ORDER — VENLAFAXINE 100 MG/1
150 TABLET ORAL 2 TIMES DAILY
COMMUNITY

## 2023-04-25 RX ORDER — ACETAMINOPHEN 500 MG
1000 TABLET ORAL EVERY 8 HOURS
Status: DISCONTINUED | OUTPATIENT
Start: 2023-04-25 | End: 2023-04-25 | Stop reason: HOSPADM

## 2023-04-25 RX ORDER — SODIUM CHLORIDE 0.9 % (FLUSH) 0.9 %
10 SYRINGE (ML) INJECTION
Status: DISCONTINUED | OUTPATIENT
Start: 2023-04-25 | End: 2023-04-25 | Stop reason: HOSPADM

## 2023-04-25 RX ORDER — ZOLPIDEM TARTRATE 10 MG/1
5 TABLET ORAL NIGHTLY PRN
COMMUNITY

## 2023-04-25 RX ORDER — LAMOTRIGINE 100 MG/1
200 TABLET ORAL DAILY
COMMUNITY

## 2023-04-25 RX ORDER — BUSPIRONE HYDROCHLORIDE 5 MG/1
15 TABLET ORAL 3 TIMES DAILY
Status: DISCONTINUED | OUTPATIENT
Start: 2023-04-25 | End: 2023-04-25 | Stop reason: HOSPADM

## 2023-04-25 RX ORDER — ACETAMINOPHEN 325 MG/1
650 TABLET ORAL EVERY 8 HOURS PRN
Status: DISCONTINUED | OUTPATIENT
Start: 2023-04-25 | End: 2023-04-25 | Stop reason: HOSPADM

## 2023-04-25 RX ORDER — LAMOTRIGINE 100 MG/1
200 TABLET ORAL DAILY
Status: DISCONTINUED | OUTPATIENT
Start: 2023-04-25 | End: 2023-04-25 | Stop reason: HOSPADM

## 2023-04-25 RX ORDER — OXYCODONE HYDROCHLORIDE 5 MG/1
10 TABLET ORAL EVERY 4 HOURS PRN
Status: DISCONTINUED | OUTPATIENT
Start: 2023-04-25 | End: 2023-04-25 | Stop reason: HOSPADM

## 2023-04-25 RX ORDER — LEVOTHYROXINE SODIUM 88 UG/1
88 TABLET ORAL
COMMUNITY

## 2023-04-25 RX ORDER — LIDOCAINE HYDROCHLORIDE 10 MG/ML
1 INJECTION, SOLUTION EPIDURAL; INFILTRATION; INTRACAUDAL; PERINEURAL ONCE AS NEEDED
Status: DISCONTINUED | OUTPATIENT
Start: 2023-04-25 | End: 2023-04-25 | Stop reason: HOSPADM

## 2023-04-25 RX ORDER — GABAPENTIN 300 MG/1
300 CAPSULE ORAL 3 TIMES DAILY
Qty: 42 CAPSULE | Refills: 0 | Status: SHIPPED | OUTPATIENT
Start: 2023-04-25 | End: 2023-05-09

## 2023-04-25 RX ORDER — ENOXAPARIN SODIUM 100 MG/ML
40 INJECTION SUBCUTANEOUS EVERY 12 HOURS
Status: DISCONTINUED | OUTPATIENT
Start: 2023-04-25 | End: 2023-04-25 | Stop reason: HOSPADM

## 2023-04-25 RX ORDER — SODIUM CHLORIDE, SODIUM LACTATE, POTASSIUM CHLORIDE, CALCIUM CHLORIDE 600; 310; 30; 20 MG/100ML; MG/100ML; MG/100ML; MG/100ML
INJECTION, SOLUTION INTRAVENOUS CONTINUOUS
Status: DISCONTINUED | OUTPATIENT
Start: 2023-04-25 | End: 2023-04-25 | Stop reason: HOSPADM

## 2023-04-25 RX ORDER — LIDOCAINE 50 MG/G
2 PATCH TOPICAL DAILY
Status: DISCONTINUED | OUTPATIENT
Start: 2023-04-25 | End: 2023-04-25 | Stop reason: HOSPADM

## 2023-04-25 RX ORDER — AMLODIPINE BESYLATE 10 MG/1
10 TABLET ORAL DAILY
COMMUNITY

## 2023-04-25 RX ORDER — GABAPENTIN 300 MG/1
300 CAPSULE ORAL 3 TIMES DAILY
Status: DISCONTINUED | OUTPATIENT
Start: 2023-04-25 | End: 2023-04-25 | Stop reason: HOSPADM

## 2023-04-25 RX ORDER — ZOLPIDEM TARTRATE 5 MG/1
5 TABLET ORAL DAILY
Status: DISCONTINUED | OUTPATIENT
Start: 2023-04-25 | End: 2023-04-25 | Stop reason: HOSPADM

## 2023-04-25 RX ORDER — LIDOCAINE 50 MG/G
2 PATCH TOPICAL DAILY
Qty: 15 PATCH | Refills: 0 | Status: SHIPPED | OUTPATIENT
Start: 2023-04-26

## 2023-04-25 RX ORDER — VENLAFAXINE 37.5 MG/1
150 TABLET ORAL 2 TIMES DAILY
Status: DISCONTINUED | OUTPATIENT
Start: 2023-04-25 | End: 2023-04-25 | Stop reason: HOSPADM

## 2023-04-25 RX ORDER — BUSPIRONE HYDROCHLORIDE 15 MG/1
15 TABLET ORAL 3 TIMES DAILY
COMMUNITY

## 2023-04-25 RX ORDER — TRAMADOL HYDROCHLORIDE 50 MG/1
50 TABLET ORAL EVERY 6 HOURS PRN
Qty: 21 TABLET | Refills: 0 | Status: SHIPPED | OUTPATIENT
Start: 2023-04-25 | End: 2023-05-02

## 2023-04-25 RX ORDER — ONDANSETRON 4 MG/1
8 TABLET, ORALLY DISINTEGRATING ORAL EVERY 6 HOURS PRN
Status: DISCONTINUED | OUTPATIENT
Start: 2023-04-25 | End: 2023-04-25 | Stop reason: HOSPADM

## 2023-04-25 RX ORDER — PANTOPRAZOLE SODIUM 40 MG/1
40 TABLET, DELAYED RELEASE ORAL NIGHTLY
Status: DISCONTINUED | OUTPATIENT
Start: 2023-04-25 | End: 2023-04-25

## 2023-04-25 RX ORDER — OXYCODONE HYDROCHLORIDE 5 MG/1
5 TABLET ORAL EVERY 6 HOURS PRN
Status: DISCONTINUED | OUTPATIENT
Start: 2023-04-25 | End: 2023-04-25 | Stop reason: HOSPADM

## 2023-04-25 RX ORDER — METHOCARBAMOL 1000 MG/1
1000 TABLET, COATED ORAL 4 TIMES DAILY
Qty: 40 TABLET | Refills: 0 | Status: SHIPPED | OUTPATIENT
Start: 2023-04-25 | End: 2023-05-05

## 2023-04-25 RX ORDER — IBUPROFEN 200 MG
1 TABLET ORAL DAILY
Status: DISCONTINUED | OUTPATIENT
Start: 2023-04-25 | End: 2023-04-25 | Stop reason: HOSPADM

## 2023-04-25 RX ORDER — TALC
6 POWDER (GRAM) TOPICAL NIGHTLY PRN
Status: DISCONTINUED | OUTPATIENT
Start: 2023-04-25 | End: 2023-04-25 | Stop reason: HOSPADM

## 2023-04-25 RX ORDER — SUCRALFATE 1 G/1
1 TABLET ORAL
Status: DISCONTINUED | OUTPATIENT
Start: 2023-04-25 | End: 2023-04-25 | Stop reason: HOSPADM

## 2023-04-25 RX ORDER — AMLODIPINE BESYLATE 5 MG/1
10 TABLET ORAL DAILY
Status: DISCONTINUED | OUTPATIENT
Start: 2023-04-25 | End: 2023-04-25 | Stop reason: HOSPADM

## 2023-04-25 RX ORDER — TRAMADOL HYDROCHLORIDE 50 MG/1
50 TABLET ORAL EVERY 6 HOURS PRN
Status: DISCONTINUED | OUTPATIENT
Start: 2023-04-25 | End: 2023-04-25 | Stop reason: HOSPADM

## 2023-04-25 RX ORDER — LEVOTHYROXINE SODIUM 88 UG/1
88 TABLET ORAL
Status: DISCONTINUED | OUTPATIENT
Start: 2023-04-25 | End: 2023-04-25 | Stop reason: HOSPADM

## 2023-04-25 RX ORDER — METHOCARBAMOL 500 MG/1
1000 TABLET, FILM COATED ORAL 4 TIMES DAILY
Status: DISCONTINUED | OUTPATIENT
Start: 2023-04-25 | End: 2023-04-25 | Stop reason: HOSPADM

## 2023-04-25 RX ADMIN — LEVOTHYROXINE SODIUM 88 MCG: 88 TABLET ORAL at 08:04

## 2023-04-25 RX ADMIN — OXYCODONE HYDROCHLORIDE 5 MG: 5 TABLET ORAL at 04:04

## 2023-04-25 RX ADMIN — SUCRALFATE 1 G: 1 TABLET ORAL at 10:04

## 2023-04-25 RX ADMIN — NICOTINE 1 PATCH: 21 PATCH, EXTENDED RELEASE TRANSDERMAL at 09:04

## 2023-04-25 RX ADMIN — METHOCARBAMOL 1000 MG: 500 TABLET ORAL at 08:04

## 2023-04-25 RX ADMIN — TRAMADOL HYDROCHLORIDE 50 MG: 50 TABLET, COATED ORAL at 05:04

## 2023-04-25 RX ADMIN — ZOLPIDEM TARTRATE 5 MG: 5 TABLET ORAL at 06:04

## 2023-04-25 RX ADMIN — SUCRALFATE 1 G: 1 TABLET ORAL at 06:04

## 2023-04-25 RX ADMIN — METHOCARBAMOL 1000 MG: 500 TABLET ORAL at 12:04

## 2023-04-25 RX ADMIN — FAMOTIDINE 20 MG: 20 TABLET, FILM COATED ORAL at 08:04

## 2023-04-25 RX ADMIN — LIDOCAINE 5% 2 PATCH: 700 PATCH TOPICAL at 10:04

## 2023-04-25 RX ADMIN — ACETAMINOPHEN 1000 MG: 500 TABLET, FILM COATED ORAL at 05:04

## 2023-04-25 RX ADMIN — Medication 6 MG: at 03:04

## 2023-04-25 RX ADMIN — ONDANSETRON 8 MG: 4 TABLET, ORALLY DISINTEGRATING ORAL at 03:04

## 2023-04-25 RX ADMIN — GABAPENTIN 300 MG: 300 CAPSULE ORAL at 08:04

## 2023-04-25 RX ADMIN — PANTOPRAZOLE SODIUM 40 MG: 40 TABLET, DELAYED RELEASE ORAL at 12:04

## 2023-04-25 RX ADMIN — OXYCODONE 5 MG: 5 TABLET ORAL at 12:04

## 2023-04-25 RX ADMIN — VENLAFAXINE 150 MG: 37.5 TABLET ORAL at 08:04

## 2023-04-25 RX ADMIN — ENOXAPARIN SODIUM 40 MG: 40 INJECTION SUBCUTANEOUS at 08:04

## 2023-04-25 RX ADMIN — SODIUM CHLORIDE, POTASSIUM CHLORIDE, SODIUM LACTATE AND CALCIUM CHLORIDE: 600; 310; 30; 20 INJECTION, SOLUTION INTRAVENOUS at 01:04

## 2023-04-25 RX ADMIN — AMLODIPINE BESYLATE 10 MG: 5 TABLET ORAL at 08:04

## 2023-04-25 RX ADMIN — BUSPIRONE HYDROCHLORIDE 15 MG: 5 TABLET ORAL at 08:04

## 2023-04-25 RX ADMIN — LAMOTRIGINE 200 MG: 100 TABLET ORAL at 08:04
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
## 2023-04-25 NOTE — ED NOTES
Pt transferred from the CT table to the ED stretcher , c spine maintained, no neuro changes noted.  Pt transported to ED 30 on monitor RN x 1 ERT x 1.
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
## 2023-04-25 NOTE — CLINICAL REVIEW
ROD Kathleen completed with patients father, Stephen Griffith, over the phone. Email sent to ALICIA Waters for delivery.

## 2023-04-25 NOTE — ED NOTES
Pt and family states pt is a recovering addict. Pt states she will take narcotics if offered to her, but wishes to not be offered them. Family requests no more narcotics to be offered or given. Narcotics were administered last night by night shift nurse, but all doses today have been held. Additional non-narcotic pain relief measures have been given, see MAR.
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
## 2024-01-25 ENCOUNTER — OFFICE VISIT (OUTPATIENT)
Dept: GYNECOLOGY | Facility: CLINIC | Age: 54
End: 2024-01-25
Payer: COMMERCIAL

## 2024-01-25 VITALS
HEART RATE: 76 BPM | TEMPERATURE: 98 F | HEIGHT: 59 IN | WEIGHT: 155.63 LBS | BODY MASS INDEX: 31.37 KG/M2 | OXYGEN SATURATION: 100 % | SYSTOLIC BLOOD PRESSURE: 113 MMHG | DIASTOLIC BLOOD PRESSURE: 79 MMHG

## 2024-01-25 DIAGNOSIS — Z01.419 ENCOUNTER FOR ANNUAL ROUTINE GYNECOLOGICAL EXAMINATION: Primary | ICD-10-CM

## 2024-01-25 PROCEDURE — 1159F MED LIST DOCD IN RCRD: CPT | Mod: CPTII,,, | Performed by: NURSE PRACTITIONER

## 2024-01-25 PROCEDURE — 3074F SYST BP LT 130 MM HG: CPT | Mod: CPTII,,, | Performed by: NURSE PRACTITIONER

## 2024-01-25 PROCEDURE — G0101 CA SCREEN;PELVIC/BREAST EXAM: HCPCS | Mod: S$PBB,,, | Performed by: NURSE PRACTITIONER

## 2024-01-25 PROCEDURE — 99214 OFFICE O/P EST MOD 30 MIN: CPT | Mod: PBBFAC,25 | Performed by: NURSE PRACTITIONER

## 2024-01-25 PROCEDURE — G0101 CA SCREEN;PELVIC/BREAST EXAM: HCPCS | Mod: PBBFAC | Performed by: NURSE PRACTITIONER

## 2024-01-25 PROCEDURE — 3078F DIAST BP <80 MM HG: CPT | Mod: CPTII,,, | Performed by: NURSE PRACTITIONER

## 2024-01-25 RX ORDER — ROSUVASTATIN CALCIUM 10 MG/1
10 TABLET, COATED ORAL
COMMUNITY

## 2024-01-25 RX ORDER — EPINEPHRINE 0.3 MG/.3ML
INJECTION SUBCUTANEOUS
COMMUNITY
Start: 2023-11-27

## 2024-01-25 NOTE — PROGRESS NOTES
"  [facility] -  Gynecology / Women's Health Clinic      Subjective:       Patient ID: Katt Ellsworth is a 53 y.o. female.    Chief Complaint:  Gynecologic Exam    History of Present Illness  The patient  here for annual exam. Her LMP was 23. Period last 3-4 days and changes tampons 3-4x/day. Admits history of HPV in teens. Colpo in 2018 for HPV positive. Pap in  and  NIL and HPV neg. MG 23 at [facility]. Hx of breast reduction. Denies breast or urinary complaints. Denies pelvic pain, abnormal bleeding or discharge. Hx of AUB in the past. Pt reports no STIs in the past and no concerns. Pt does c/o night sweats. Contraception consist of TL. Colonoscopy in , repeat in 10 years. Denies fly hx of ovarian, uterine cancer. Maternal grandmother with breast cancer. Paternal aunt with colon cancer. No GYN complaints in the past.     GYN & OB History  Patient's last menstrual period was 2023.   Date of Last Pap: 2022    Review of patient's allergies indicates:   Allergen Reactions    Doxycycline Anaphylaxis, Nausea And Vomiting and Hives    Folic acid      Past Medical History:   Diagnosis Date    Abnormal Pap smear of cervix     Anemia     Hypertension      OB History    Para Term  AB Living   3 3 0 0 0     SAB IAB Ectopic Multiple Live Births   0 0 0          # Outcome Date GA Lbr Dave/2nd Weight Sex Delivery Anes PTL Lv   3 Para            2 Para            1 Para                 Review of Systems  Review of Systems    Negative except for pertinent findings for positives per HPI     Objective:    Physical Exam    /79 (BP Location: Right arm, Patient Position: Sitting, BP Method: Medium (Automatic))   Pulse 76   Temp 98.1 °F (36.7 °C) (Oral)   Ht 4' 11" (1.499 m)   Wt 70.6 kg (155 lb 9.6 oz)   LMP 2023   SpO2 100%   BMI 31.43 kg/m²   GENERAL: Well-developed female. No acute distress.    SKIN: Normal to inspection, warm and " intact.  BREASTS: No rashes or erythema. No masses, lumps, discharge, tenderness. Juan Alberto breast reduction  VULVA: General appearance normal; external genitalia with no erythema. Skin tag x2 to Rt groin  VAGINA: Mucosa/vaginal vault pink, no abnormal discharge or lesions.  CERVIX: Pink, parous appearing os, no erythema or abnormal discharge.  BIMANUAL EXAM: reveals a 10 week-sized uterus. The uterus is non tender. Juan Alberto adnexa reveal no tenderness.  PSYCHIATRIC: Patient is oriented to person, place, and time. Mood and affect are normal.    Assessment:       1. Encounter for annual routine gynecological examination       Plan:   Katt was seen today for gynecologic exam.    Diagnoses and all orders for this visit:    Encounter for annual routine gynecological examination    Pelvic today, pap utd per ACOG  Monitor cycles, 1 full year without cycle with indicate menopause  Follow up in about 1 year (around 1/25/2025) for annual exam.

## 2025-01-30 ENCOUNTER — OFFICE VISIT (OUTPATIENT)
Dept: GYNECOLOGY | Facility: CLINIC | Age: 55
End: 2025-01-30
Payer: COMMERCIAL

## 2025-01-30 VITALS
WEIGHT: 150.19 LBS | HEIGHT: 59 IN | TEMPERATURE: 98 F | HEART RATE: 76 BPM | SYSTOLIC BLOOD PRESSURE: 109 MMHG | RESPIRATION RATE: 18 BRPM | OXYGEN SATURATION: 98 % | DIASTOLIC BLOOD PRESSURE: 74 MMHG | BODY MASS INDEX: 30.28 KG/M2

## 2025-01-30 DIAGNOSIS — N90.89 SKIN TAG OF VULVA: ICD-10-CM

## 2025-01-30 DIAGNOSIS — Z12.4 PAP SMEAR FOR CERVICAL CANCER SCREENING: Primary | ICD-10-CM

## 2025-01-30 PROCEDURE — 3078F DIAST BP <80 MM HG: CPT | Mod: CPTII,,, | Performed by: NURSE PRACTITIONER

## 2025-01-30 PROCEDURE — 4010F ACE/ARB THERAPY RXD/TAKEN: CPT | Mod: CPTII,,, | Performed by: NURSE PRACTITIONER

## 2025-01-30 PROCEDURE — 3074F SYST BP LT 130 MM HG: CPT | Mod: CPTII,,, | Performed by: NURSE PRACTITIONER

## 2025-01-30 PROCEDURE — 99214 OFFICE O/P EST MOD 30 MIN: CPT | Mod: PBBFAC | Performed by: NURSE PRACTITIONER

## 2025-01-30 PROCEDURE — 1159F MED LIST DOCD IN RCRD: CPT | Mod: CPTII,,, | Performed by: NURSE PRACTITIONER

## 2025-01-30 PROCEDURE — 99396 PREV VISIT EST AGE 40-64: CPT | Mod: S$PBB,,, | Performed by: NURSE PRACTITIONER

## 2025-01-30 PROCEDURE — 3008F BODY MASS INDEX DOCD: CPT | Mod: CPTII,,, | Performed by: NURSE PRACTITIONER

## 2025-01-30 PROCEDURE — 88174 CYTOPATH C/V AUTO IN FLUID: CPT | Performed by: NURSE PRACTITIONER

## 2025-01-30 NOTE — PROGRESS NOTES
"  [facility] -  Gynecology / Women's Health Clinic      Subjective:       Patient ID: Katt Ellsworth is a 54 y.o. female.    Chief Complaint:  Well Woman    History of Present Illness  The patient  here for annual exam. Her LMP was 24. Period last 7 days and changes tampons 4-5x/day. Admits history of HPV in teens. Colpo in 2018 for HPV positive. Pap in  and  NIL and HPV neg. MG-1/3/25 at [facility]. Hx of breast reduction. Denies breast or urinary complaints. Denies pelvic pain, abnormal bleeding or discharge. Pt c/o vaginal skin tags that bother her. Hx of AUB in the past. Pt reports no STIs in the past and no concerns. Contraception consist of TL. Colonoscopy in , repeat in 10 years. Denies fly hx of ovarian, uterine cancer. Maternal grandmother with breast cancer. Paternal aunt with colon cancer. No GYN complaints in the past.     GYN & OB History  Patient's last menstrual period was 2024 (exact date).   Date of Last Pap: 2022    Review of patient's allergies indicates:   Allergen Reactions    Doxycycline Anaphylaxis, Nausea And Vomiting and Hives    Folic acid      Past Medical History:   Diagnosis Date    Abnormal Pap smear of cervix     Anemia     Hypertension      OB History    Para Term  AB Living   3 3 0 0 0     SAB IAB Ectopic Multiple Live Births   0 0 0          # Outcome Date GA Lbr Dave/2nd Weight Sex Type Anes PTL Lv   3 Para            2 Para            1 Para                 Review of Systems  Review of Systems    Negative except for pertinent findings for positives per HPI     Objective:    Physical Exam    /74 (BP Location: Right arm, Patient Position: Sitting)   Pulse 76   Temp 97.9 °F (36.6 °C) (Oral)   Resp 18   Ht 4' 11" (1.499 m)   Wt 68.1 kg (150 lb 3.2 oz)   LMP 2024 (Exact Date)   SpO2 98%   BMI 30.34 kg/m²   GENERAL: Well-developed female. No acute distress.    SKIN: Normal to inspection, " warm and intact.  BREASTS: No rashes or erythema. No masses, lumps, discharge, tenderness.  VULVA: General appearance normal; external genitalia with no erythema. Skin tag x3 to RT labia/groin  VAGINA: Mucosa/vaginal vault pink, no abnormal discharge or lesions.  CERVIX: Pink, nulliparous appearing os, no erythema or abnormal discharge.  BIMANUAL EXAM: reveals a 10 week-sized uterus. The uterus is non tender. Juan Alberto adnexa reveal no tenderness.  PSYCHIATRIC: Patient is oriented to person, place, and time. Mood and affect are normal.    Assessment:         ICD-10-CM ICD-9-CM   1. Pap smear for cervical cancer screening  Z12.4 V76.2   2. Skin tag of vulva  N90.89 624.8     Plan:   Katt was seen today for well woman.    Diagnoses and all orders for this visit:    Pap smear for cervical cancer screening  -     Liquid-Based Pap Smear, Screening    Skin tag of vulva    Pap today, request annual every other year  Call clinic with vaginal bleeding which would be abnormal  Schedule with GYN for skin tag removal  Follow up in about 1 year (around 1/30/2026).

## 2025-05-19 ENCOUNTER — OFFICE VISIT (OUTPATIENT)
Dept: GYNECOLOGY | Facility: CLINIC | Age: 55
End: 2025-05-19
Payer: COMMERCIAL

## 2025-05-19 VITALS
HEIGHT: 59 IN | SYSTOLIC BLOOD PRESSURE: 106 MMHG | OXYGEN SATURATION: 99 % | DIASTOLIC BLOOD PRESSURE: 77 MMHG | TEMPERATURE: 98 F | WEIGHT: 149.38 LBS | BODY MASS INDEX: 30.12 KG/M2 | HEART RATE: 78 BPM

## 2025-05-19 DIAGNOSIS — N90.89 VULVAR SKIN TAG: Primary | ICD-10-CM

## 2025-05-19 PROCEDURE — 99214 OFFICE O/P EST MOD 30 MIN: CPT | Mod: PBBFAC

## 2025-05-19 PROCEDURE — 11200 RMVL SKIN TAGS UP TO&INC 15: CPT | Mod: PBBFAC

## 2025-05-19 PROCEDURE — 56606 BIOPSY OF VULVA/PERINEUM: CPT | Mod: PBBFAC

## 2025-05-19 PROCEDURE — 88305 TISSUE EXAM BY PATHOLOGIST: CPT | Mod: TC,91

## 2025-05-19 RX ORDER — SILVER NITRATE 38.21; 12.74 MG/1; MG/1
3 STICK TOPICAL
Status: COMPLETED | OUTPATIENT
Start: 2025-05-19 | End: 2025-05-19

## 2025-05-19 RX ORDER — LIDOCAINE HYDROCHLORIDE AND EPINEPHRINE 10; 10 UG/ML; MG/ML
5 INJECTION, SOLUTION INFILTRATION; PERINEURAL
Status: COMPLETED | OUTPATIENT
Start: 2025-05-19 | End: 2025-05-19

## 2025-05-19 RX ADMIN — LIDOCAINE HYDROCHLORIDE AND EPINEPHRINE 5 ML: 10; 10 INJECTION, SOLUTION INFILTRATION; PERINEURAL at 10:05

## 2025-05-19 RX ADMIN — SILVER NITRATE 3 APPLICATOR: 38.21; 12.74 STICK TOPICAL at 10:05

## 2025-05-20 LAB
ESTROGEN SERPL-MCNC: NORMAL PG/ML
INSULIN SERPL-ACNC: NORMAL U[IU]/ML
LAB AP CLINICAL INFORMATION: NORMAL
LAB AP GROSS DESCRIPTION: NORMAL
LAB AP REPORT FOOTNOTES: NORMAL

## 2025-05-20 NOTE — PROCEDURES
Biopsy (Gynecological)    Date/Time: 5/19/2025 8:30 AM    Performed by: Jose Kohler MD  Authorized by: Agnes Burgos MD    Consent obtained:  Prior to procedure the appropriate consent was completed and verified   Patient was prepped and draped in the normal sterile fashion.  Local anesthesia used?: Yes    Anesthesia:  Local infiltration  Local anesthetic:  Lidocaine 1% with epinephrine  Anesthetic total (ml):  0.5    Biopsy Location:  Vulva  Vulva:     # of lesions:  2  Estimated blood loss (cc):  1   Patient tolerated the procedure well with no immediate complications.     Skin tags at 8 o'clock of right labia each approximately 0.5 cm next to one another noted at the fold of the labia majora and thigh.Each skin tag was grasped with  and cut at the base with scissors.  Hemostasis achieved with silver nitrate.  Jose Kohler MD PGY3  Obstetrics & Gynecology